# Patient Record
Sex: MALE | Race: WHITE | Employment: FULL TIME | URBAN - METROPOLITAN AREA
[De-identification: names, ages, dates, MRNs, and addresses within clinical notes are randomized per-mention and may not be internally consistent; named-entity substitution may affect disease eponyms.]

---

## 2018-08-17 ENCOUNTER — OFFICE VISIT (OUTPATIENT)
Dept: FAMILY MEDICINE CLINIC | Age: 53
End: 2018-08-17

## 2018-08-17 VITALS
HEART RATE: 95 BPM | OXYGEN SATURATION: 96 % | DIASTOLIC BLOOD PRESSURE: 72 MMHG | HEIGHT: 71 IN | TEMPERATURE: 97.6 F | RESPIRATION RATE: 12 BRPM | BODY MASS INDEX: 26.46 KG/M2 | WEIGHT: 189 LBS | SYSTOLIC BLOOD PRESSURE: 130 MMHG

## 2018-08-17 DIAGNOSIS — Z13.1 ENCOUNTER FOR SCREENING FOR DIABETES MELLITUS: ICD-10-CM

## 2018-08-17 DIAGNOSIS — S81.812A LACERATION OF LEFT LOWER EXTREMITY, INITIAL ENCOUNTER: Primary | ICD-10-CM

## 2018-08-17 DIAGNOSIS — Z72.89 OTHER PROBLEMS RELATED TO LIFESTYLE: ICD-10-CM

## 2018-08-17 DIAGNOSIS — Z13.220 SCREENING FOR HYPERLIPIDEMIA: ICD-10-CM

## 2018-08-17 DIAGNOSIS — Z12.5 SCREENING PSA (PROSTATE SPECIFIC ANTIGEN): ICD-10-CM

## 2018-08-17 DIAGNOSIS — Z13.0 SCREENING FOR BLOOD DISEASE: ICD-10-CM
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
## 2018-08-17 PROCEDURE — 99204 OFFICE O/P NEW MOD 45 MIN: CPT | Performed by: NURSE PRACTITIONER

## 2018-08-17 RX ORDER — CEPHALEXIN 500 MG/1
CAPSULE ORAL
Refills: 0 | COMMUNITY
Start: 2018-08-13 | End: 2018-08-24

## 2018-08-17 RX ORDER — SULFAMETHOXAZOLE AND TRIMETHOPRIM 800; 160 MG/1; MG/1
1 TABLET ORAL 2 TIMES DAILY
Qty: 14 TABLET | Refills: 0 | Status: SHIPPED | OUTPATIENT
Start: 2018-08-17 | End: 2018-08-24

## 2018-08-17 ASSESSMENT — PATIENT HEALTH QUESTIONNAIRE - PHQ9
SUM OF ALL RESPONSES TO PHQ QUESTIONS 1-9: 0
SUM OF ALL RESPONSES TO PHQ QUESTIONS 1-9: 0
SUM OF ALL RESPONSES TO PHQ9 QUESTIONS 1 & 2: 0
1. LITTLE INTEREST OR PLEASURE IN DOING THINGS: 0
2. FEELING DOWN, DEPRESSED OR HOPELESS: 0

## 2018-08-24 ENCOUNTER — OFFICE VISIT (OUTPATIENT)
Dept: FAMILY MEDICINE CLINIC | Age: 53
End: 2018-08-24

## 2018-08-24 VITALS
BODY MASS INDEX: 26.74 KG/M2 | SYSTOLIC BLOOD PRESSURE: 128 MMHG | OXYGEN SATURATION: 97 % | DIASTOLIC BLOOD PRESSURE: 68 MMHG | WEIGHT: 191 LBS | TEMPERATURE: 97.6 F | HEART RATE: 60 BPM | HEIGHT: 71 IN | RESPIRATION RATE: 12 BRPM

## 2018-08-24 DIAGNOSIS — Z48.02 VISIT FOR SUTURE REMOVAL: Primary | ICD-10-CM

## 2018-08-24 PROCEDURE — 99213 OFFICE O/P EST LOW 20 MIN: CPT | Performed by: NURSE PRACTITIONER

## 2018-08-24 PROCEDURE — S0630 REMOVAL OF SUTURES: HCPCS | Performed by: NURSE PRACTITIONER

## 2018-08-24 NOTE — PROGRESS NOTES
Subjective  Nai Kaiser, 45 y.o. male presents today with:  Chief Complaint   Patient presents with    Suture / Staple Removal       Suture / Staple Removal   The sutures were placed 11 to 14 days ago. He tried regular soap and water washings, a wound recheck, antibiotic ointment use and oral antibiotics since the wound repair. The treatment provided significant relief. His temperature was unmeasured prior to arrival. There has been no drainage from the wound. The redness has improved. The swelling has improved. There is no pain present. He has no difficulty moving the affected extremity or digit. Objective    Vitals:    08/24/18 0931   BP: 128/68   Pulse: 60   Resp: 12   Temp: 97.6 °F (36.4 °C)   TempSrc: Temporal   SpO2: 97%   Weight: 191 lb (86.6 kg)   Height: 5' 11\" (1.803 m)       Physical Exam   Constitutional: He is oriented to person, place, and time. He appears well-developed and well-nourished. HENT:   Head: Normocephalic and atraumatic. Eyes: Conjunctivae and EOM are normal.   Neck: Normal range of motion. Pulmonary/Chest: Effort normal.   Musculoskeletal: Normal range of motion. Neurological: He is alert and oriented to person, place, and time. Skin: Skin is warm and dry. Laceration noted. Psychiatric: He has a normal mood and affect. Patient presents for suture removal. The wound is well healing without signs of infection. Area was cleansed with a saline/peroxide mixture then with betadine. The sutures were removed 5 in total. Wound was well approximated. Area was dried and antibiotic ointment and bandage applied. Wound care and activity instructions given. Assessment & Plan    Diagnosis Orders   1. Visit for suture removal  VT REMOVAL OF SUTURES       Return if symptoms worsen or fail to improve. Reviewed with the patient: current clinical status, medications, activities and diet.      Side effects, adverse effects of the medication prescribed today, as well as

## 2018-09-05 ASSESSMENT — ENCOUNTER SYMPTOMS
CHEST TIGHTNESS: 0
ABDOMINAL PAIN: 0
SHORTNESS OF BREATH: 0

## 2018-09-05 NOTE — PROGRESS NOTES
updated. Health Maintenance reviewed. Objective    Vitals:    08/17/18 1357   BP: 130/72   Pulse: 95   Resp: 12   Temp: 97.6 °F (36.4 °C)   TempSrc: Temporal   SpO2: 96%   Weight: 189 lb (85.7 kg)   Height: 5' 11\" (1.803 m)       Physical Exam   Constitutional: He is oriented to person, place, and time. He appears well-developed and well-nourished. HENT:   Head: Normocephalic and atraumatic. Right Ear: Hearing, external ear and ear canal normal.   Left Ear: Hearing, external ear and ear canal normal.   Mouth/Throat: Uvula is midline and mucous membranes are normal.   Eyes: EOM are normal.   Neck: Normal range of motion. Cardiovascular: Normal rate, regular rhythm and normal heart sounds. Pulmonary/Chest: Effort normal and breath sounds normal.   Abdominal: Soft. Bowel sounds are normal.   Musculoskeletal: Normal range of motion. Neurological: He is alert and oriented to person, place, and time. Skin: Skin is warm and dry. Laceration (erythamtous and swelling noted. Laceration appears well approximated and sutures are intact. no drainage noted. ) noted. Psychiatric: He has a normal mood and affect. Area was cleansed and dressed in dry sterile dressing. Patient was given instructions on wound care and dressing changes. Assessment & Plan    Diagnosis Orders   1. Laceration of left lower extremity, initial encounter  mupirocin (BACTROBAN) 2 % ointment     sulfamethoxazole-trimethoprim (BACTRIM DS;SEPTRA DS) 800-160 MG per tablet   2. Contact with chainsaw as cause of accidental injury  mupirocin (BACTROBAN) 2 % ointment     sulfamethoxazole-trimethoprim (BACTRIM DS;SEPTRA DS) 800-160 MG per tablet   3. Screening for hyperlipidemia  Lipid Panel   4. Encounter for screening for diabetes mellitus  Comprehensive Metabolic Panel   5. Screening PSA (prostate specific antigen)  PSA Screening   6. Screening for blood disease  CBC Auto Differential   7.  Other problems related to lifestyle  HIV-1,2 Combo Ag/Ab EIA with Reflex    Hepatitis C Antibody       Return in about 1 week (around 8/24/2018) for suture removal .    Reviewed with the patient: current clinical status, medications, activities and diet. Side effects, adverse effects of the medication prescribed today, as well as treatment plan/ rationale and result expectations have been discussed with the patient who expresses understanding and desires to proceed. Close follow up to evaluate treatment results and for coordination of care. I have reviewed the patient's medical history in detail and updated the computerized patient record.     Janett Khoury, APRN - CNP

## 2021-02-22 ENCOUNTER — OFFICE VISIT (OUTPATIENT)
Dept: FAMILY MEDICINE CLINIC | Age: 56
End: 2021-02-22
Payer: COMMERCIAL

## 2021-02-22 VITALS
BODY MASS INDEX: 26.35 KG/M2 | OXYGEN SATURATION: 97 % | HEART RATE: 62 BPM | SYSTOLIC BLOOD PRESSURE: 118 MMHG | HEIGHT: 71 IN | TEMPERATURE: 97.2 F | DIASTOLIC BLOOD PRESSURE: 70 MMHG | WEIGHT: 188.2 LBS

## 2021-02-22 DIAGNOSIS — Z11.4 ENCOUNTER FOR SCREENING FOR HIV: ICD-10-CM

## 2021-02-22 DIAGNOSIS — Z00.00 ANNUAL PHYSICAL EXAM: Primary | ICD-10-CM

## 2021-02-22 DIAGNOSIS — Z11.59 ENCOUNTER FOR HEPATITIS C SCREENING TEST FOR LOW RISK PATIENT: ICD-10-CM

## 2021-02-22 DIAGNOSIS — Z13.1 SCREENING FOR DIABETES MELLITUS (DM): ICD-10-CM

## 2021-02-22 PROCEDURE — G8484 FLU IMMUNIZE NO ADMIN: HCPCS | Performed by: FAMILY MEDICINE

## 2021-02-22 PROCEDURE — 99386 PREV VISIT NEW AGE 40-64: CPT | Performed by: FAMILY MEDICINE

## 2021-02-22 ASSESSMENT — ENCOUNTER SYMPTOMS
RHINORRHEA: 0
CONSTIPATION: 0
COUGH: 0
ABDOMINAL PAIN: 0
SORE THROAT: 0
WHEEZING: 0
DIARRHEA: 0
SHORTNESS OF BREATH: 0

## 2021-02-22 ASSESSMENT — PATIENT HEALTH QUESTIONNAIRE - PHQ9
1. LITTLE INTEREST OR PLEASURE IN DOING THINGS: 0
2. FEELING DOWN, DEPRESSED OR HOPELESS: 0
SUM OF ALL RESPONSES TO PHQ QUESTIONS 1-9: 0

## 2021-02-22 NOTE — PROGRESS NOTES
[address] [place] [address] [facility]  [address] [address]  Dept: [phone]  Dept Fax: : [phone]     Chief Complaint  Chief Complaint   Patient presents with   [address]     not fasting for labs        HPI:  60 y. o.male who presents for the following:  (establish care; used to see Dr. 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 Sometimes gets oily stools; nonsmoker. Review of Systems   Constitutional: Negative for chills and fever. HENT: Negative for congestion, rhinorrhea and sore throat. Respiratory: Negative for cough, shortness of breath and wheezing. Gastrointestinal: Negative for abdominal pain, constipation and diarrhea. Endocrine: Negative for polydipsia and polyuria. Genitourinary: Negative for dysuria, frequency and urgency. Neurological: Negative for syncope, light-headedness, numbness and headaches. Psychiatric/Behavioral: Negative for sleep disturbance. The patient is not nervous/anxious. No past medical history on file. Past Surgical History:   Procedure Laterality Date    COLONOSCOPY  6/16/14    DR. HANLEY     Social History     Socioeconomic History    Marital status:      Spouse name: Not on file    Number of children: Not on file    Years of education: Not on file    Highest education level: Not on file   Occupational History    Not on file   Social Needs    Financial resource strain: Not hard at all   Doris-Nasima insecurity     Worry: Never true     Inability: Never true   [employer] needs     Medical: No     Non-medical: No   Tobacco Use    Smoking status: Never Smoker    Smokeless tobacco: Never Used   Substance and Sexual Activity    Alcohol use:  Yes     Alcohol/week: 1.0 standard drinks     Types: 1 Glasses of wine per week    Drug use: No    Sexual activity: Not on file   Lifestyle    Physical activity     Days per week: Not on file Minutes per session: Not on file    Stress: Not on file   Relationships    Social connections     Talks on phone: Not on file     Gets together: Not on file     Attends Yazdanism service: Not on file     Active member of club or organization: Not on file     Attends meetings of clubs or organizations: Not on file     Relationship status: Not on file    Intimate partner violence     Fear of current or ex partner: Not on file     Emotionally abused: Not on file     Physically abused: Not on file     Forced sexual activity: Not on file   Other Topics Concern    Not on file   Social History Narrative    Not on file     No family history on file. Allergies   Allergen Reactions    Amoxicillin Nausea And Vomiting     Current Outpatient Medications   Medication Sig Dispense Refill    mupirocin (BACTROBAN) 2 % ointment Apply topically 3 times daily. 30 g 0     No current facility-administered medications for this visit. Vitals:    02/22/21 1103   BP: 118/70   Site: Right Upper Arm   Position: Sitting   Cuff Size: Medium Adult   Pulse: 62   Temp: 97.2 °F (36.2 °C)   TempSrc: Infrared   SpO2: 97%   Weight: 188 lb 3.2 oz (85.4 kg)   Height: 5' 11\" (1.803 m)       Physical exam:  Physical Exam  Vitals signs reviewed. Constitutional:       General: He is not in acute distress. Appearance: He is well-developed. HENT:      Head: Normocephalic and atraumatic. Right Ear: Tympanic membrane, ear canal and external ear normal.      Left Ear: Tympanic membrane, ear canal and external ear normal.      Mouth/Throat:      Pharynx: No oropharyngeal exudate. Neck:      Musculoskeletal: Normal range of motion. Thyroid: No thyromegaly. Cardiovascular:      Rate and Rhythm: Normal rate and regular rhythm. Heart sounds: Normal heart sounds. No murmur. Pulmonary:      Effort: Pulmonary effort is normal. No respiratory distress. Breath sounds: Normal breath sounds. No wheezing.    Abdominal: General: There is no distension. Palpations: Abdomen is soft. Tenderness: There is no abdominal tenderness. There is no guarding or rebound. Lymphadenopathy:      Cervical: No cervical adenopathy. Skin:     General: Skin is warm and dry. Neurological:      Mental Status: He is alert and oriented to person, place, and time. Psychiatric:         Behavior: Behavior normal.         Assessment/Plan:  60 y.o. male here mainly for establish and annual:  - routine labs and screenings     Diagnosis Orders   1. Annual physical exam  Lipid, Fasting    Comprehensive Metabolic Panel, Fasting   2. Encounter for hepatitis C screening test for low risk patient  Hepatitis C Antibody   3. Encounter for screening for HIV  HIV Screen   4.  Screening for diabetes mellitus (DM)  Hemoglobin A1C        Return in about 1 year (around 2/22/2022) for annual exam.    Briana Brown MD

## 2021-02-25 ENCOUNTER — NURSE ONLY (OUTPATIENT)
Dept: FAMILY MEDICINE CLINIC | Age: 56
End: 2021-02-25
Payer: COMMERCIAL

## 2021-02-25 ENCOUNTER — HOSPITAL ENCOUNTER (OUTPATIENT)
Age: 56
Setting detail: SPECIMEN
Discharge: HOME | End: 2021-02-25
Payer: COMMERCIAL

## 2021-02-25 DIAGNOSIS — Z13.1 SCREENING FOR DIABETES MELLITUS (DM): ICD-10-CM

## 2021-02-25 DIAGNOSIS — Z11.4 ENCOUNTER FOR SCREENING FOR HIV: ICD-10-CM

## 2021-02-25 DIAGNOSIS — Z11.59 ENCOUNTER FOR HEPATITIS C SCREENING TEST FOR LOW RISK PATIENT: ICD-10-CM

## 2021-02-25 DIAGNOSIS — Z11.59 NEED FOR HEPATITIS C SCREENING TEST: ICD-10-CM

## 2021-02-25 DIAGNOSIS — Z00.00 ROUTINE GENERAL MEDICAL EXAMINATION AT A HEALTH CARE FACILITY: ICD-10-CM

## 2021-02-25 DIAGNOSIS — Z00.00 ANNUAL PHYSICAL EXAM: ICD-10-CM

## 2021-02-25 LAB
ALBUMIN SERPL-MCNC: 4.3 G/DL (ref 3.5–4.6)
ALP BLD-CCNC: 52 U/L (ref 35–104)
ALT SERPL-CCNC: 18 U/L (ref 0–41)
ANION GAP SERPL CALCULATED.3IONS-SCNC: 6 MEQ/L (ref 9–15)
AST SERPL-CCNC: 16 U/L (ref 0–40)
BILIRUB SERPL-MCNC: 0.3 MG/DL (ref 0.2–0.7)
BUN BLDV-MCNC: 18 MG/DL (ref 6–20)
CALCIUM SERPL-MCNC: 9.1 MG/DL (ref 8.5–9.9)
CHLORIDE BLD-SCNC: 103 MEQ/L (ref 95–107)
CHOLESTEROL, FASTING: 187 MG/DL (ref 0–199)
CO2: 30 MEQ/L (ref 20–31)
CREAT SERPL-MCNC: 0.88 MG/DL (ref 0.7–1.2)
GFR AFRICAN AMERICAN: >60
GFR NON-AFRICAN AMERICAN: >60
GLOBULIN: 2.7 G/DL (ref 2.3–3.5)
GLUCOSE FASTING: 89 MG/DL (ref 70–99)
HBA1C MFR BLD: 5.4 % (ref 4.8–5.9)
HDLC SERPL-MCNC: 67 MG/DL (ref 40–59)
HEPATITIS C ANTIBODY INTERPRETATION: NORMAL
LDL CHOLESTEROL CALCULATED: 110 MG/DL (ref 0–129)
POTASSIUM SERPL-SCNC: 4.5 MEQ/L (ref 3.4–4.9)
SODIUM BLD-SCNC: 139 MEQ/L (ref 135–144)
TOTAL PROTEIN: 7 G/DL (ref 6.3–8)
TRIGLYCERIDE, FASTING: 51 MG/DL (ref 0–150)

## 2021-02-25 PROCEDURE — 83036 HEMOGLOBIN GLYCOSYLATED A1C: CPT

## 2021-02-25 PROCEDURE — 80053 COMPREHEN METABOLIC PANEL: CPT

## 2021-02-25 PROCEDURE — 80061 LIPID PANEL: CPT

## 2021-02-25 PROCEDURE — 87389 HIV-1 AG W/HIV-1&-2 AB AG IA: CPT

## 2021-02-25 PROCEDURE — 86803 HEPATITIS C AB TEST: CPT

## 2021-02-25 PROCEDURE — 36415 COLL VENOUS BLD VENIPUNCTURE: CPT | Performed by: FAMILY MEDICINE

## 2021-02-27 LAB — HIV AG/AB: NONREACTIVE
